# Patient Record
Sex: FEMALE | Race: ASIAN | Employment: FULL TIME | URBAN - METROPOLITAN AREA
[De-identification: names, ages, dates, MRNs, and addresses within clinical notes are randomized per-mention and may not be internally consistent; named-entity substitution may affect disease eponyms.]

---

## 2021-02-23 ENCOUNTER — OFFICE VISIT (OUTPATIENT)
Dept: NEUROLOGY | Facility: CLINIC | Age: 56
End: 2021-02-23
Payer: COMMERCIAL

## 2021-02-23 VITALS
BODY MASS INDEX: 26 KG/M2 | RESPIRATION RATE: 14 BRPM | SYSTOLIC BLOOD PRESSURE: 118 MMHG | HEART RATE: 78 BPM | WEIGHT: 172 LBS | DIASTOLIC BLOOD PRESSURE: 74 MMHG

## 2021-02-23 DIAGNOSIS — M54.6 LEFT-SIDED THORACIC BACK PAIN, UNSPECIFIED CHRONICITY: ICD-10-CM

## 2021-02-23 DIAGNOSIS — R20.0 LEFT FACIAL NUMBNESS: ICD-10-CM

## 2021-02-23 DIAGNOSIS — M54.2 NECK PAIN: ICD-10-CM

## 2021-02-23 DIAGNOSIS — R20.2 PARESTHESIA OF LEFT ARM AND LEG: Primary | ICD-10-CM

## 2021-02-23 PROCEDURE — 3078F DIAST BP <80 MM HG: CPT | Performed by: OTHER

## 2021-02-23 PROCEDURE — 99204 OFFICE O/P NEW MOD 45 MIN: CPT | Performed by: OTHER

## 2021-02-23 PROCEDURE — 3074F SYST BP LT 130 MM HG: CPT | Performed by: OTHER

## 2021-02-23 NOTE — PROGRESS NOTES
[facility] [address]   Neurology; INITIAL CLINIC VISIT  2021, 11:10 AM     Donal Dixon Patient Status:  No patient class for patient encounter    3/3/1965 MRN [id number]   Location [facility], [facility] ([address] UT) Oral Cap Take 2,000 Units by mouth daily.      • PEG 3350-KCl-Na Bicarb-NaCl (TRILYTE) 420 G Oral Recon Soln Take as directed (Patient not taking: Reported on 2/23/2021 ) 1 Bottle 0         REVIEW OF SYSTEMS:    GENERAL HEALTH:  feels well, calm, is normal in volume, follow commends well;  Memory and comprehension:  MMSE is normal,   Cranial Nerves       CN II:  Visual fields full, Pupils equal and reactive, Fundi normal       CN III, IV, VI:  Horrizontal and vertical movements normal.  Normal purs today is normal     Symptomatic treatment included  Neck PT, back PT is given,   Self upper neck and back exercise afterwards,   May use motrin PRN,     Return if symptoms worsen or fail to improve.     We discussed in depth regarding diagnosis, prognosis,

## 2021-03-12 ENCOUNTER — TELEPHONE (OUTPATIENT)
Dept: NEUROLOGY | Facility: CLINIC | Age: 56
End: 2021-03-12

## 2021-03-12 NOTE — TELEPHONE ENCOUNTER
Patient contacted our office regarding getting the COVID-19 vaccine.   Patient was informed that the [facility] providers are all in favor of everyone getting the vaccine, provided they meet the CDC guidelines for receiving it (visit CDC website f

## 2021-03-12 NOTE — TELEPHONE ENCOUNTER
Patient informed that there is nothing in Dr Bouchra Becerril notes that would contraindicate patient receiving COVID vaccine today.

## 2021-03-23 ENCOUNTER — TELEPHONE (OUTPATIENT)
Dept: NEUROLOGY | Facility: CLINIC | Age: 56
End: 2021-03-23

## 2021-04-15 ENCOUNTER — TELEPHONE (OUTPATIENT)
Dept: NEUROLOGY | Facility: CLINIC | Age: 56
End: 2021-04-15

## 2021-05-05 ENCOUNTER — TELEPHONE (OUTPATIENT)
Dept: NEUROLOGY | Facility: CLINIC | Age: 56
End: 2021-05-05

## 2021-07-12 ENCOUNTER — OFFICE VISIT (OUTPATIENT)
Dept: NEUROLOGY | Facility: CLINIC | Age: 56
End: 2021-07-12
Payer: COMMERCIAL

## 2021-07-12 VITALS
BODY MASS INDEX: 27 KG/M2 | SYSTOLIC BLOOD PRESSURE: 112 MMHG | DIASTOLIC BLOOD PRESSURE: 66 MMHG | WEIGHT: 182.31 LBS | HEART RATE: 64 BPM | RESPIRATION RATE: 16 BRPM

## 2021-07-12 DIAGNOSIS — R20.2 PARESTHESIA OF LEFT ARM AND LEG: ICD-10-CM

## 2021-07-12 DIAGNOSIS — M54.2 NECK PAIN: ICD-10-CM

## 2021-07-12 DIAGNOSIS — R20.0 LEFT FACIAL NUMBNESS: Primary | ICD-10-CM

## 2021-07-12 DIAGNOSIS — M54.6 LEFT-SIDED THORACIC BACK PAIN, UNSPECIFIED CHRONICITY: ICD-10-CM

## 2021-07-12 PROCEDURE — 3078F DIAST BP <80 MM HG: CPT | Performed by: OTHER

## 2021-07-12 PROCEDURE — 3074F SYST BP LT 130 MM HG: CPT | Performed by: OTHER

## 2021-07-12 PROCEDURE — 99214 OFFICE O/P EST MOD 30 MIN: CPT | Performed by: OTHER

## 2021-07-12 NOTE — PROGRESS NOTES
[facility] [address]   Neurology; follow up  CLINIC VISIT  2021     Sammi Crawford Patient Status:  No patient class for patient encounter    3/3/1965 MRN [id number]   Location [address] [place] [address], [address] [place] [address] morning, she feels left side, is numb and different, When she is moving around she feels better. She is back since her symptoms returns.  She gained weight 10 pounds,       PAST MEDICAL HISTORY:  Past Medical History:   Diagnosis Date   • Numbness        PA 16   Wt 182 lb 4.8 oz (82.7 kg)   BMI 27.32 kg/m²    Body mass index is 27.32 kg/m². General:  Patient is a 60year old female in no acute distress. appearance: Normal developed and well nourished , in no acute stress;   HEENT:  Normal conjunctiva, no abn reviewed report.        Problem List Items Addressed This Visit     Left facial numbness - Primary    Relevant Orders    MRI SPINE CERVICAL (CPT=72141)    MRI BRAIN (W+WO) (CPT=70553)    Left-sided thoracic back pain    Relevant Orders    MRI SPINE LUMBAR (

## 2021-07-20 ENCOUNTER — TELEPHONE (OUTPATIENT)
Dept: NEUROLOGY | Facility: CLINIC | Age: 56
End: 2021-07-20

## 2021-07-20 NOTE — TELEPHONE ENCOUNTER
Lumbar (57188) and Brain (10257) MRI's Denied     Denial Reason:    96305 --- Advanced imaging may be indicated when conservative treatment requirements have been met and symptoms have not improved or resolved.     23705 --- The use of MRI for evaluation of

## 2021-07-22 NOTE — TELEPHONE ENCOUNTER
Tell her MRI denied, she had brain MRI last year, will treat her first, her back MRI is also denied from here, she may need to see back doctor if they can order

## 2021-07-27 NOTE — TELEPHONE ENCOUNTER
Patient called using language lines  Ubaldo Cid [id number]. Message left on VM by  that MRI Brain and Lumbar Spine denied by insurance and Dr Nandini Snow wants her to do PT as ordered.  MRI Cervical Spine approved and she can call Central Scheduling f

## 2021-08-26 ENCOUNTER — TELEPHONE (OUTPATIENT)
Dept: NEUROLOGY | Facility: CLINIC | Age: 56
End: 2021-08-26

## 2021-08-26 NOTE — TELEPHONE ENCOUNTER
Authorization  on 21. [facility] does not give Extensions on Authorizations. Patient is scheduled for 21.    Previous Authorization withdrawn from [facility]       Resubmitted PA via [facility] for [id number] -Pending

## 2021-09-09 NOTE — TELEPHONE ENCOUNTER
Utilized  Lala Lay [id number] with language line solutions, pt hung up twice, unable to make aware. None of pt family members answer phone or have VM boxes set up or VM box full.   Called CS to attempt to switch appt to other MRI ordered, informed th

## 2021-09-09 NOTE — TELEPHONE ENCOUNTER
Tell her insurance denial,   Do other MRI first, do neck PT, then back to see me to discuss the need of cervical MRI

## 2021-09-09 NOTE — TELEPHONE ENCOUNTER
*Scheduled for 09/12/21 at [facility]     Cervical MRI (95818) Denied     Denial Reason :    Advanced imaging may be indicated when conservative treatment requirements have been met.      Provider has option to contact [facility] at [phone] to speak with

## 2021-09-09 NOTE — TELEPHONE ENCOUNTER
CS called to advise that they also were not able to connect with patient through . MRI canceled, but patient not aware.

## 2022-01-18 ENCOUNTER — OFFICE VISIT (OUTPATIENT)
Dept: NEUROLOGY | Facility: CLINIC | Age: 57
End: 2022-01-18
Payer: COMMERCIAL

## 2022-01-18 VITALS
DIASTOLIC BLOOD PRESSURE: 68 MMHG | BODY MASS INDEX: 27 KG/M2 | SYSTOLIC BLOOD PRESSURE: 118 MMHG | RESPIRATION RATE: 16 BRPM | HEART RATE: 66 BPM | WEIGHT: 179.81 LBS

## 2022-01-18 DIAGNOSIS — M54.2 NECK PAIN: ICD-10-CM

## 2022-01-18 DIAGNOSIS — R20.2 PARESTHESIA OF LEFT ARM AND LEG: ICD-10-CM

## 2022-01-18 DIAGNOSIS — M54.6 LEFT-SIDED THORACIC BACK PAIN, UNSPECIFIED CHRONICITY: ICD-10-CM

## 2022-01-18 DIAGNOSIS — R20.0 LEFT FACIAL NUMBNESS: Primary | ICD-10-CM

## 2022-01-18 PROCEDURE — 99214 OFFICE O/P EST MOD 30 MIN: CPT | Performed by: OTHER

## 2022-01-18 PROCEDURE — 3078F DIAST BP <80 MM HG: CPT | Performed by: OTHER

## 2022-01-18 PROCEDURE — 3074F SYST BP LT 130 MM HG: CPT | Performed by: OTHER

## 2022-01-18 NOTE — PROGRESS NOTES
[facility] [address]   Neurology; follow up  CLINIC VISIT  2022    Herrera Lockhart Patient Status:  No patient class for patient encounter    3/3/1965 MRN [id number]   [address] [facility] [place] [address], but in the morning, she feels left side, is numb and different, she feels her skin in her left arm and leg is darker than the right side, When she is moving around she feels better. She is back since her symptoms returns.    She remains have numbness bleeding  ENDOCRINE: denies excessive thirst or urination; denies unexpected wt gain or wt loss  ALLERGY/IMM.: denies food or seasonal allergies      PHYSICAL EXAMINATION:  VITAL SIGNS: /68   Pulse 66   Resp 16   Wt 179 lb 12.8 oz (81.6 kg)   BMI 26. HTS;   Gait: Normal based,  Romberg sign is negative, Tandem walk is normal        LABS:  normal   Reviewed with patient      IMAGING: reviewed film or imaging with patient. brain MRI last 10/2020 normal in [facility], reviewed report.        Problem List Items

## 2022-03-14 PROBLEM — M54.2 NECK PAIN: Status: RESOLVED | Noted: 2021-02-23 | Resolved: 2022-03-14

## 2022-03-14 PROBLEM — R73.03 PRE-DIABETES: Status: ACTIVE | Noted: 2022-03-14

## 2022-03-14 PROBLEM — R12 HEARTBURN: Status: ACTIVE | Noted: 2022-03-14

## 2022-03-14 PROBLEM — Z86.39 HISTORY OF VITAMIN D DEFICIENCY: Status: ACTIVE | Noted: 2022-03-14

## 2022-03-14 PROBLEM — M54.6 LEFT-SIDED THORACIC BACK PAIN: Status: RESOLVED | Noted: 2021-02-23 | Resolved: 2022-03-14

## 2022-04-01 ENCOUNTER — TELEPHONE (OUTPATIENT)
Dept: NEUROLOGY | Facility: CLINIC | Age: 57
End: 2022-04-01

## 2022-04-01 NOTE — TELEPHONE ENCOUNTER
pt req new order for MRI spine lumbar and when that is ready fax that order along with the order for MRI spine cervical from 1/22 to [address] [address] at [phone]

## 2022-04-01 NOTE — TELEPHONE ENCOUNTER
Per Epic review, pt seen in office 22. PT and MRI Cervical MRI ordered. In LOV pt did discuss symptoms occurring in lower extremity    Per below, pt requesting order for MRI Lumbar and to have that and MRI Cervical faxed to [facility]. Would also need to investigate if office would do PA for imaging studies (MRI Cervical PA ) or would be handled by [facility].

## 2022-06-03 ENCOUNTER — OFFICE VISIT (OUTPATIENT)
Dept: NEUROLOGY | Facility: CLINIC | Age: 57
End: 2022-06-03
Payer: COMMERCIAL

## 2022-06-03 VITALS
HEIGHT: 68.5 IN | DIASTOLIC BLOOD PRESSURE: 70 MMHG | SYSTOLIC BLOOD PRESSURE: 102 MMHG | RESPIRATION RATE: 16 BRPM | WEIGHT: 183 LBS | HEART RATE: 60 BPM | BODY MASS INDEX: 27.42 KG/M2

## 2022-06-03 DIAGNOSIS — M47.22 OSTEOARTHRITIS OF SPINE WITH RADICULOPATHY, CERVICAL REGION: ICD-10-CM

## 2022-06-03 DIAGNOSIS — M47.26 OSTEOARTHRITIS OF SPINE WITH RADICULOPATHY, LUMBAR REGION: ICD-10-CM

## 2022-06-03 DIAGNOSIS — R20.2 PARESTHESIA OF LEFT ARM AND LEG: ICD-10-CM

## 2022-06-03 DIAGNOSIS — R20.0 LEFT FACIAL NUMBNESS: Primary | ICD-10-CM

## 2023-10-17 NOTE — TELEPHONE ENCOUNTER
Pt called back. Informed of MRI denials and need for PT, follow up after PT. Verbalized understanding, no further questions. Yes

## 2023-11-28 ENCOUNTER — LAB REQUISITION (OUTPATIENT)
Age: 58
End: 2023-11-28
Payer: COMMERCIAL

## 2023-11-28 DIAGNOSIS — R59.0 CERVICAL LYMPHADENOPATHY: ICD-10-CM

## 2023-11-28 PROCEDURE — 88184 FLOWCYTOMETRY/ TC 1 MARKER: CPT | Performed by: PATHOLOGY

## 2023-11-28 PROCEDURE — 88185 FLOWCYTOMETRY/TC ADD-ON: CPT | Performed by: PATHOLOGY

## 2023-12-04 LAB
CD10 CELLS NFR SPEC: <1 %
CD10/CD19: <1 %
CD19 CELLS NFR SPEC: 31 %
CD19+/CD200+: 24 %
CD2 CELLS NFR SPEC: 66 %
CD20 CELLS NFR SPEC: 31 %
CD200 CELLS: 31 %
CD3 CELLS NFR SPEC: 65 %
CD3+/TCRGD+: 1 %
CD3+CD4+ CELLS NFR SPEC: 54 %
CD3+CD4+ CELLS/CD3+CD8+ CLL SPEC: 6
CD3+CD8+ CELLS NFR SPEC: 9 %
CD3-/CD56+: 1 %
CD34 CELLS NFR SPEC: <1 %
CD38 CELLS NFR SPEC: 1 %
CD38+/CD19+: <1 %
CD45 CELLS NFR SPEC: 100 %
CD5 CELLS NFR SPEC: 69 %
CD5/CD19 CELLS: 2 %
CD7 CELLS NFR SPEC: 66 %
CELL SURF KAPPA/LAMBDA RATIO: 1.8
CELL SURF LAMBDA LIGHT CHAIN: 11 %
CELL SURFACE KAPPA LIGHT CHAIN: 20 %
TCR G-D CELLS NFR SPEC: 1 %

## (undated) DIAGNOSIS — M54.40 LOW BACK PAIN WITH SCIATICA, SCIATICA LATERALITY UNSPECIFIED, UNSPECIFIED BACK PAIN LATERALITY, UNSPECIFIED CHRONICITY: Primary | ICD-10-CM